# Patient Record
Sex: MALE | Race: WHITE | Employment: FULL TIME | RURAL
[De-identification: names, ages, dates, MRNs, and addresses within clinical notes are randomized per-mention and may not be internally consistent; named-entity substitution may affect disease eponyms.]

---

## 2019-02-11 ENCOUNTER — OFFICE VISIT (OUTPATIENT)
Dept: FAMILY MEDICINE CLINIC | Age: 29
End: 2019-02-11

## 2019-02-11 VITALS
SYSTOLIC BLOOD PRESSURE: 141 MMHG | TEMPERATURE: 98.1 F | HEART RATE: 106 BPM | WEIGHT: 166 LBS | BODY MASS INDEX: 22.48 KG/M2 | HEIGHT: 72 IN | OXYGEN SATURATION: 95 % | RESPIRATION RATE: 16 BRPM | DIASTOLIC BLOOD PRESSURE: 92 MMHG

## 2019-02-11 DIAGNOSIS — R03.0 ELEVATED BLOOD-PRESSURE READING WITHOUT DIAGNOSIS OF HYPERTENSION: ICD-10-CM

## 2019-02-11 DIAGNOSIS — F32.9 MAJOR DEPRESSIVE DISORDER, REMISSION STATUS UNSPECIFIED, UNSPECIFIED WHETHER RECURRENT: Primary | ICD-10-CM

## 2019-02-11 RX ORDER — AMITRIPTYLINE HYDROCHLORIDE 50 MG/1
TABLET, FILM COATED ORAL
Qty: 51 TAB | Refills: 0 | Status: SHIPPED | OUTPATIENT
Start: 2019-02-11 | End: 2019-02-27 | Stop reason: SDUPTHER

## 2019-02-11 RX ORDER — AMITRIPTYLINE HYDROCHLORIDE 75 MG/1
TABLET, FILM COATED ORAL
COMMUNITY
End: 2019-02-11 | Stop reason: SDUPTHER

## 2019-02-11 NOTE — PROGRESS NOTES
Dora Yoon 
34 y.o. male 1990 
[address] [place] [address] 
[id number] [facility] Practice: Progress Note Encounter Date: 2/11/2019 Chief Complaint Patient presents with  Establish Care  
  med refill from previous doctor History provided by patient History of Present Illness Dora Yoon is a 34 y.o. male who presents to clinic today for: NEW PATIENT New patient who presents to establish PCP care. I personally reviewed and updated the patient's medical, surgical, family and social history. PREVIOUS PRIMARY CARE PROVIDER and SPECIALISTS Dr. Ralf Post. Patient decided to come to [facility] due to insurance swithced. RECORDS Provided by patient: none. SPECIALISTS Patient Care Team: 
Mei Jimenez MD as PCP - [facility]) Depression Review: 
Patient is seen for anxiety disorder. Current treatment includes Elavil and no other therapies. Patient reports that elavil work well over the spring anf summer but Ongoig symptoms include: decreased sleep, depressed mood, hopelessness and inappropriate guilt . Patient denies: anhedonia and suicidal ideation homocidal ideation. Reported side effects from the treatment: none Health Maintenance Health Maintenance Due Topic Date Due  
 DTaP/Tdap/Td series (1 - Tdap) 01/15/2011 Review of Systems Review of Systems Constitutional: Negative for chills and fever. Respiratory: Negative for cough, hemoptysis, sputum production and shortness of breath. Cardiovascular: Negative for chest pain and palpitations. Genitourinary: Negative for dysuria and urgency. Skin: Negative for itching and rash. Psychiatric/Behavioral: Positive for depression. Negative for hallucinations, substance abuse and suicidal ideas. The patient is nervous/anxious and has insomnia. Vitals/Objective:  
 
Vitals:  
 02/11/19 1504 BP: (!) 141/92 Pulse: (!) 106 Resp: 16 Temp: 98.1 °F (36.7 °C) TempSrc: Oral  
SpO2: 95% Weight: 166 lb (75.3 kg) Height: 5' 11.75\" (1.822 m) Body mass index is 22.67 kg/m². Wt Readings from Last 3 Encounters:  
02/11/19 166 lb (75.3 kg) 11/30/13 135 lb (61.2 kg) 05/05/13 145 lb (65.8 kg) Physical Exam  
Constitutional: He appears well-developed and well-nourished. HENT:  
Head: Normocephalic and atraumatic. Cardiovascular: Normal rate and regular rhythm. Pulmonary/Chest: Effort normal and breath sounds normal.  
Neurological: He is alert. No results found for this or any previous visit (from the past 24 hour(s)). Assessment and Plan:  
 
Encounter Diagnoses ICD-10-CM ICD-9-CM 1. Major depressive disorder, remission status unspecified, unspecified whether recurrent F32.9 296.20 2. Elevated blood-pressure reading without diagnosis of hypertension R03.0 796.2 1. Major depressive disorder, remission status unspecified, unspecified whether recurrent Resume elavil with ramp-up dosing. Patient to f/u in 2 weeks for mood. - amitriptyline (ELAVIL) 50 mg tablet; Take 1 Tab by mouth nightly for 4 days, THEN 1.5 Tabs nightly for 7 days, THEN 2 Tabs nightly for 18 days. Dispense: 51 Tab; Refill: 0 
- METABOLIC PANEL, BASIC 
- LIPID PANEL I have discussed the diagnosis with the patient and the intended plan as seen in the above orders. he has expressed understanding. The patient has received an after-visit summary and questions were answered concerning future plans. I have discussed medication side effects and warnings with the patient as well. Electronically Signed: Ashvin Mendez MD 
  
History/Allergies Patients past medical, surgical and family histories were reviewed and updated. Past Medical History:  
Diagnosis Date  Depression 10/2018  
 and anxiety  Hypoglycemia 2018 History reviewed. No pertinent surgical history. Family History Problem Relation Age of Onset  Diabetes Mother  Diabetes Father  Colon Cancer Maternal Aunt 48 Colorectal cancer Social History Socioeconomic History  Marital status: SINGLE Spouse name: Not on file  Number of children: Not on file  Years of education: Not on file  Highest education level: Not on file Social Needs  Financial resource strain: Not on file  Food insecurity - worry: Not on file  Food insecurity - inability: Not on file  Transportation needs - medical: Not on file  Transportation needs - non-medical: Not on file Occupational History  Not on file Tobacco Use  Smoking status: Never Smoker  Smokeless tobacco: Never Used Substance and Sexual Activity  Alcohol use: No  
 Drug use: No  
 Sexual activity: Not Currently Other Topics Concern  Not on file Social History Narrative  Not on file No Known Allergies Disposition Follow-up Disposition: 
Return in about 2 weeks (around 2/25/2019) for mood. . 
 
No future appointments. Current Medications after this visit Current Outpatient Medications Medication Sig  
 amitriptyline (ELAVIL) 50 mg tablet Take 1 Tab by mouth nightly for 4 days, THEN 1.5 Tabs nightly for 7 days, THEN 2 Tabs nightly for 18 days. No current facility-administered medications for this visit. Medications Discontinued During This Encounter Medication Reason  azithromycin (ZITHROMAX Z-SEFERINO) 250 mg tablet Not A Current Medication  amitriptyline (ELAVIL) 75 mg tablet Reorder

## 2019-02-11 NOTE — PROGRESS NOTES
1. Have you been to the ER, urgent care clinic since your last visit? Hospitalized since your last visit? No 
 
2. Have you seen or consulted any other health care providers outside of the [facility] since your last visit? Include any pap smears or colon screening. No 
Reviewed record in preparation for visit and have necessary documentation Pt did not bring medication to office visit for review Goals that were addressed and/or need to be completed during or after this appointment include Health Maintenance Due Topic Date Due  
 DTaP/Tdap/Td series (1 - Tdap) 01/15/2011  Influenza Age 5 to Adult  08/01/2018

## 2019-02-11 NOTE — PATIENT INSTRUCTIONS
Depression Treatment: Care Instructions Your Care Instructions Depression is a condition that affects the way you feel, think, and act. It causes symptoms such as low energy, loss of interest in daily activities, and sadness or grouchiness that goes on for a long time. Depression is very common and affects men and women of all ages. Depression is a medical illness caused by changes in the natural chemicals in your brain. It is not a character flaw, and it does not mean that you are a bad or weak person. It does not mean that you are going crazy. It is important to know that depression can be treated. Medicines, counseling, and self-care can all help. Many people do not get help because they are embarrassed or think that they will get over the depression on their own. But some people do not get better without treatment. Follow-up care is a key part of your treatment and safety. Be sure to make and go to all appointments, and call your doctor if you are having problems. It's also a good idea to know your test results and keep a list of the medicines you take. How can you care for yourself at home? Learn about antidepressant medicines Antidepressant medicines can improve or end the symptoms of depression. You may need to take the medicine for at least 6 months, and often longer. Keep taking your medicine even if you feel better. If you stop taking it too soon, your symptoms may come back or get worse. You may start to feel better within 1 to 3 weeks of taking antidepressant medicine. But it can take as many as 6 to 8 weeks to see more improvement. Talk to your doctor if you have problems with your medicine or if you do not notice any improvement after 3 weeks. Antidepressants can make you feel tired, dizzy, or nervous. Some people have dry mouth, constipation, headaches, sexual problems, an upset stomach, or diarrhea.  Many of these side effects are mild and go away on their own after you take the medicine for a few weeks. Some may last longer. Talk to your doctor if side effects bother you too much. You might be able to try a different medicine. If you are pregnant or breastfeeding, talk to your doctor about what medicines you can take. Learn about counseling In many cases, counseling can work as well as medicines to treat mild to moderate depression. Counseling is done by licensed mental health providers, such as psychologists, social workers, and some types of nurses. It can be done in one-on-one sessions or in a group setting. Many people find group sessions helpful. Cognitive-behavioral therapy is a type of counseling. In this treatment therapy, you learn how to see and change unhelpful thinking styles that may be adding to your depression. Counseling and medicines often work well when used together. To manage depression · Be physically active. Getting 30 minutes of exercise each day is good for your body and your mind. Begin slowly if it is hard for you to get started. If you already exercise, keep it up. · Plan something pleasant for yourself every day. Include activities that you have enjoyed in the past. 
· Get enough sleep. Talk to your doctor if you have problems sleeping. · Eat a balanced diet. If you do not feel hungry, eat small snacks rather than large meals. · Do not drink alcohol, use illegal drugs, or take medicines that your doctor has not prescribed for you. They may interfere with your treatment. · Spend time with family and friends. It may help to speak openly about your depression with people you trust. 
· Take your medicines exactly as prescribed. Call your doctor if you think you are having a problem with your medicine. · Do not make major life decisions while you are depressed. Depression may change the way you think. You will be able to make better decisions after you feel better. · Think positively. Challenge negative thoughts with statements such as \"I am hopeful\"; \"Things will get better\"; and \"I can ask for the help I need. \" Write down these statements and read them often, even if you don't believe them yet. · Be patient with yourself. It took time for your depression to develop, and it will take time for your symptoms to improve. Do not take on too much or be too hard on yourself. · Learn all you can about depression from written and online materials. · Check out behavioral health classes to learn more about dealing with depression. · Keep the numbers for these national suicide hotlines: 7-422-587-TALK (2-688.387.9008) and 7-636-CPVHKTI (5-290.461.2022). If you or someone you know talks about suicide or feeling hopeless, get help right away. When should you call for help? Call 911 anytime you think you may need emergency care. For example, call if: 
  · You feel you cannot stop from hurting yourself or someone else.  
[facility] your doctor now or seek immediate medical care if: 
  · You hear voices.  
  · You feel much more depressed.  
 Watch closely for changes in your health, and be sure to contact your doctor if: 
  · You are having problems with your depression medicine.  
  · You are not getting better as expected. Where can you learn more? Go to http://miguel-brian.info/. Enter A282 in the search box to learn more about \"Depression Treatment: Care Instructions. \" Current as of: September 11, 2018 Content Version: 11.9 © 9033-9731 [employer]. Care instructions adapted under license by [employer] (which disclaims liability or warranty for this information). If you have questions about a medical condition or this instruction, always ask your healthcare professional. [address] any warranty or liability for your use of this information.

## 2019-02-12 LAB
BUN SERPL-MCNC: 11 MG/DL (ref 6–20)
BUN/CREAT SERPL: 14 (ref 9–20)
CALCIUM SERPL-MCNC: 10.3 MG/DL (ref 8.7–10.2)
CHLORIDE SERPL-SCNC: 100 MMOL/L (ref 96–106)
CHOLEST SERPL-MCNC: 160 MG/DL (ref 100–199)
CO2 SERPL-SCNC: 26 MMOL/L (ref 20–29)
CREAT SERPL-MCNC: 0.81 MG/DL (ref 0.76–1.27)
GLUCOSE SERPL-MCNC: 85 MG/DL (ref 65–99)
HDLC SERPL-MCNC: 37 MG/DL
LDLC SERPL CALC-MCNC: 81 MG/DL (ref 0–99)
POTASSIUM SERPL-SCNC: 4.7 MMOL/L (ref 3.5–5.2)
SODIUM SERPL-SCNC: 140 MMOL/L (ref 134–144)
TRIGL SERPL-MCNC: 208 MG/DL (ref 0–149)
VLDLC SERPL CALC-MCNC: 42 MG/DL (ref 5–40)

## 2019-02-27 ENCOUNTER — OFFICE VISIT (OUTPATIENT)
Dept: FAMILY MEDICINE CLINIC | Age: 29
End: 2019-02-27

## 2019-02-27 VITALS
WEIGHT: 167 LBS | HEIGHT: 72 IN | OXYGEN SATURATION: 99 % | RESPIRATION RATE: 16 BRPM | SYSTOLIC BLOOD PRESSURE: 128 MMHG | TEMPERATURE: 98.9 F | BODY MASS INDEX: 22.62 KG/M2 | DIASTOLIC BLOOD PRESSURE: 85 MMHG | HEART RATE: 100 BPM

## 2019-02-27 DIAGNOSIS — F32.9 MAJOR DEPRESSIVE DISORDER, REMISSION STATUS UNSPECIFIED, UNSPECIFIED WHETHER RECURRENT: ICD-10-CM

## 2019-02-27 PROBLEM — F33.1 DEPRESSION, MAJOR, RECURRENT, MODERATE (HCC): Status: ACTIVE | Noted: 2019-02-27

## 2019-02-27 RX ORDER — AMITRIPTYLINE HYDROCHLORIDE 75 MG/1
75 TABLET, FILM COATED ORAL
Qty: 30 TAB | Refills: 1 | Status: SHIPPED | OUTPATIENT
Start: 2019-02-27 | End: 2019-03-28 | Stop reason: SDUPTHER

## 2019-02-27 RX ORDER — ESCITALOPRAM OXALATE 10 MG/1
10 TABLET ORAL
Qty: 30 TAB | Refills: 0 | Status: SHIPPED | OUTPATIENT
Start: 2019-02-27 | End: 2019-03-28 | Stop reason: SDUPTHER

## 2019-02-27 NOTE — PROGRESS NOTES
1. Have you been to the ER, urgent care clinic, or been hospitalized since your last visit? No      2. Have you seen or consulted any other health care providers outside of the [address] [place] [address] since your last visit? No    Reviewed record in preparation for visit and have necessary documentation  opportunity was given for questions  Goals that were addressed and/or need to be completed during or after this appointment include     ,There are no preventive care reminders to display for this patient.

## 2019-02-27 NOTE — PROGRESS NOTES
Damian Calvillo  34 y.o. male  1990  [address]  [address] [place] [address] [address] [facility]  [phone]     [facility] Practice: Progress Note       Encounter Date: 2/27/2019    Chief Complaint   Patient presents with    Depression     seems to help with depression, still feels anxious, experiencing dry mouth       History provided by patient  History of Present Illness   Damian Calvillo is a 34 y.o. male who presents to clinic today for:    Depression and Anxiety Review:  Patient is seen for depression and anxiety. Current treatment includes Elavil and no other therapies. Has been feeling less depressed but more anxious all the time. Prior treatments: Lexapro (helped but drowsiness)  Ongoig symptoms include:  racing thoughts, psychomotor agitation, difficulty concentrating. Patient denies: anhedonia, depressed mood and inappropriate guilt suicidal ideation, homocidal ideation. Reported side effects from the treatment: dry mouth (worse with the 100  Mg dose but present with all)     Health Maintenance  There are no preventive care reminders to display for this patient. Review of Systems   Review of Systems   Constitutional: Negative for chills and fever. Cardiovascular: Negative for chest pain and palpitations. Gastrointestinal: Negative for abdominal pain, heartburn, nausea and vomiting. Genitourinary: Negative for dysuria, frequency, hematuria and urgency. Skin: Negative for itching and rash. Neurological: Negative for dizziness and headaches. Psychiatric/Behavioral: Positive for depression. Negative for hallucinations, memory loss and suicidal ideas. The patient is nervous/anxious. The patient does not have insomnia. Vitals/Objective:     Vitals:    02/27/19 1417   BP: 128/85   Pulse: 100   Resp: 16   Temp: 98.9 °F (37.2 °C)   TempSrc: Oral   SpO2: 99%   Weight: 167 lb (75.8 kg)   Height: 5' 11.75\" (1.822 m)     Body mass index is 22.81 kg/m².     Wt Readings from Last 3 Encounters:   02/27/19 167 lb (75.8 kg)   02/11/19 166 lb (75.3 kg)   11/30/13 135 lb (61.2 kg)       Physical Exam   Constitutional: He is oriented to person, place, and time. He appears well-developed and well-nourished. Cardiovascular: Normal rate and regular rhythm. No murmur heard. Pulmonary/Chest: Effort normal and breath sounds normal.   Musculoskeletal: Normal range of motion. He exhibits no edema or tenderness. Neurological: He is alert and oriented to person, place, and time. No results found for this or any previous visit (from the past 24 hour(s)). Assessment and Plan:     Encounter Diagnoses     ICD-10-CM ICD-9-CM   1. Major depressive disorder, remission status unspecified, unspecified whether recurrent F32.9 296.20       1. Major depressive disorder, remission status unspecified, unspecified whether recurrent  Reduce dose of elavil due to side effects and add lexapro for anxiety. Patient given number for counselor but reports that his insurance with his job will not start for another month or too and he may not be able to afford medication. - amitriptyline (ELAVIL) 75 mg tablet; Take 1 Tab by mouth nightly. Dispense: 30 Tab; Refill: 1  - escitalopram oxalate (LEXAPRO) 10 mg tablet; Take 1 Tab by mouth nightly. Dispense: 30 Tab; Refill: 0      I have discussed the diagnosis with the patient and the intended plan as seen in the above orders. he has expressed understanding. The patient has received an after-visit summary and questions were answered concerning future plans. I have discussed medication side effects and warnings with the patient as well. Electronically Signed: Fracnes Tobias MD     History/Allergies   Patients past medical, surgical and family histories were reviewed and updated. Past Medical History:   Diagnosis Date    Depression 10/2018    and anxiety    Ganglion cyst of dorsum of left wrist     Hypoglycemia 2018    History reviewed. No pertinent surgical history.   Family History Problem Relation Age of Onset    Diabetes Mother     Diabetes Father     Colon Cancer Maternal Aunt 48        Colorectal cancer     Social History     Socioeconomic History    Marital status: SINGLE     Spouse name: Not on file    Number of children: Not on file    Years of education: Not on file    Highest education level: Not on file   Social Needs    Financial resource strain: Not on file    Food insecurity - worry: Not on file    Food insecurity - inability: Not on file   [employer] needs - medical: Not on file   [employer] needs - non-medical: Not on file   Occupational History    Not on file   Tobacco Use    Smoking status: Never Smoker    Smokeless tobacco: Never Used   Substance and Sexual Activity    Alcohol use: No    Drug use: No    Sexual activity: Not Currently   Other Topics Concern    Not on file   Social History Narrative    Not on file         No Known Allergies    Disposition     Follow-up Disposition:  Return in about 2 weeks (around 3/13/2019) for mood. .    Future Appointments   Date Time Provider Rachel Harrell   3/13/2019  2:10 PM Yadiel Cullen MD [facility] SCHED            Current Medications after this visit     Current Outpatient Medications   Medication Sig    amitriptyline (ELAVIL) 75 mg tablet Take 1 Tab by mouth nightly.  escitalopram oxalate (LEXAPRO) 10 mg tablet Take 1 Tab by mouth nightly. No current facility-administered medications for this visit.       Medications Discontinued During This Encounter   Medication Reason    amitriptyline (ELAVIL) 50 mg tablet Reorder

## 2019-03-28 DIAGNOSIS — F32.9 MAJOR DEPRESSIVE DISORDER, REMISSION STATUS UNSPECIFIED, UNSPECIFIED WHETHER RECURRENT: ICD-10-CM

## 2019-03-28 RX ORDER — AMITRIPTYLINE HYDROCHLORIDE 75 MG/1
75 TABLET, FILM COATED ORAL
Qty: 30 TAB | Refills: 0 | Status: SHIPPED | OUTPATIENT
Start: 2019-03-28 | End: 2019-04-26 | Stop reason: SDUPTHER

## 2019-03-28 RX ORDER — ESCITALOPRAM OXALATE 10 MG/1
10 TABLET ORAL
Qty: 30 TAB | Refills: 0 | Status: SHIPPED | OUTPATIENT
Start: 2019-03-28 | End: 2019-04-26

## 2019-04-26 ENCOUNTER — OFFICE VISIT (OUTPATIENT)
Dept: FAMILY MEDICINE CLINIC | Age: 29
End: 2019-04-26

## 2019-04-26 VITALS
RESPIRATION RATE: 16 BRPM | DIASTOLIC BLOOD PRESSURE: 76 MMHG | TEMPERATURE: 98.6 F | OXYGEN SATURATION: 97 % | WEIGHT: 174 LBS | HEIGHT: 71 IN | HEART RATE: 100 BPM | BODY MASS INDEX: 24.36 KG/M2 | SYSTOLIC BLOOD PRESSURE: 123 MMHG

## 2019-04-26 DIAGNOSIS — F41.1 GAD (GENERALIZED ANXIETY DISORDER): ICD-10-CM

## 2019-04-26 DIAGNOSIS — Z82.0 FAMILY HISTORY OF NARCOLEPSY: ICD-10-CM

## 2019-04-26 DIAGNOSIS — G47.19 EXCESSIVE DAYTIME SLEEPINESS: ICD-10-CM

## 2019-04-26 DIAGNOSIS — F32.9 MAJOR DEPRESSIVE DISORDER, REMISSION STATUS UNSPECIFIED, UNSPECIFIED WHETHER RECURRENT: Primary | ICD-10-CM

## 2019-04-26 RX ORDER — AMITRIPTYLINE HYDROCHLORIDE 75 MG/1
75 TABLET, FILM COATED ORAL
Qty: 30 TAB | Refills: 5 | Status: SHIPPED | OUTPATIENT
Start: 2019-04-26 | End: 2019-11-07 | Stop reason: SDUPTHER

## 2019-04-26 RX ORDER — ESCITALOPRAM OXALATE 10 MG/1
20 TABLET ORAL
Qty: 60 TAB | Refills: 0 | Status: SHIPPED | OUTPATIENT
Start: 2019-04-26 | End: 2019-06-04

## 2019-04-26 NOTE — PROGRESS NOTES
1. Have you been to the ER, urgent care clinic since your last visit? Hospitalized since your last visit? No    2. Have you seen or consulted any other health care providers outside of the [address] [place] [address] since your last visit? Include any pap smears or colon screening. No  Reviewed record in preparation for visit and have necessary documentation  Pt did not bring medication to office visit for review    Goals that were addressed and/or need to be completed during or after this appointment include     There are no preventive care reminders to display for this patient.

## 2019-04-26 NOTE — PROGRESS NOTES
Naa Davis  34 y.o. male  1990  [address] [address]  [id number]     [address]: Progress Note       Encounter Date: 4/26/2019    Chief Complaint   Patient presents with    Follow-up       History provided by patient  History of Present Illness   Naa Davis is a 34 y.o. male who presents to clinic today for:    Depression and Anxiety Review:  Patient is seen for depression and anxiety. Current treatment includes Elavil and lexapro and no other therapies. Patient endorses that his insurance will not start until May. Prior treatments: Lexapro (helped but drowsiness)  Ongoig symptoms include:  racing thoughts, psychomotor agitation, difficulty concentrating. +anger flares. No inciting cause has been happening intermittently for >6 months. Patient denies: anhedonia, depressed mood and inappropriate guilt suicidal ideation, homocidal ideation. Reported side effects from the treatment: n/a     Fatigue  Patient reports that he never feels rested despite sleeping. He now works on the nightshift. Patient is unaware of snoring but endorses that if he sits more then a few minutes, he will fall asleep. Health Maintenance  There are no preventive care reminders to display for this patient. Review of Systems   Review of Systems   Constitutional: Negative for chills and fever. Cardiovascular: Negative for chest pain and palpitations. Skin: Negative for itching and rash. Psychiatric/Behavioral: The patient is nervous/anxious and has insomnia. Vitals/Objective:     Vitals:    04/26/19 0948   BP: 123/76   Pulse: 100   Resp: 16   Temp: 98.6 °F (37 °C)   TempSrc: Oral   SpO2: 97%   Weight: 174 lb (78.9 kg)   Height: 5' 11\" (1.803 m)     Body mass index is 24.27 kg/m².     Wt Readings from Last 3 Encounters:   04/26/19 174 lb (78.9 kg)   02/27/19 167 lb (75.8 kg)   02/11/19 166 lb (75.3 kg)       Physical Exam   Constitutional: He is oriented to person, place, and time. He appears well-developed and well-nourished. No distress. HENT:   Head: Normocephalic. Right Ear: External ear normal.   Left Ear: External ear normal.   Mouth/Throat: Oropharynx is clear and moist.   Eyes: Right eye exhibits no discharge. Left eye exhibits no discharge. Cardiovascular: Normal rate and regular rhythm. Pulmonary/Chest: Effort normal and breath sounds normal.   Musculoskeletal: Normal range of motion. He exhibits no edema. Neurological: He is alert and oriented to person, place, and time. No results found for this or any previous visit (from the past 24 hour(s)). Assessment and Plan:     Encounter Diagnoses     ICD-10-CM ICD-9-CM   1. Major depressive disorder, remission status unspecified, unspecified whether recurrent F32.9 296.20   2. DAVID (generalized anxiety disorder) F41.1 300.02   3. Excessive daytime sleepiness G47.19 780.54   4. Family history of narcolepsy Z82.0 V19.8       1. Major depressive disorder, remission status unspecified, unspecified whether recurrent  2. DAVID (generalized anxiety disorder)  Patient agreed to increase dose of lexapro at this time. He agreed to f/u with psychiatry once his insurance starts. - escitalopram oxalate (LEXAPRO) 10 mg tablet; Take 2 Tabs by mouth nightly. Indications: Anxiousness associated with Depression  Dispense: 60 Tab; Refill: 0  - amitriptyline (ELAVIL) 75 mg tablet; Take 1 Tab by mouth nightly. Indications: depression  Dispense: 30 Tab; Refill: 5  - REFERRAL TO PSYCHIATRY    3. Excessive daytime sleepiness  4. Family history of narcolepsy  Patient reports prior sleep study that was negative for sleep apnea but is otherwise not clear if any other findings. Will refer to sleep medicine in [place] per patient preference. - REFERRAL TO SLEEP STUDIES      I have discussed the diagnosis with the patient and the intended plan as seen in the above orders. he has expressed understanding.   The patient has received an after-visit summary and questions were answered concerning future plans. I have discussed medication side effects and warnings with the patient as well. Electronically Signed: Jeffrey Barker MD     History/Allergies   Patients past medical, surgical and family histories were reviewed and updated. Past Medical History:   Diagnosis Date    Depression 10/2018    and anxiety    Ganglion cyst of dorsum of left wrist     Hypoglycemia 2018      History reviewed. No pertinent surgical history. Family History   Problem Relation Age of Onset    Diabetes Mother     Diabetes Father     Other Father         Narcolepsy    Colon Cancer Maternal Aunt 48        Colorectal cancer     Social History     Tobacco Use    Smoking status: Never Smoker    Smokeless tobacco: Never Used   Substance Use Topics    Alcohol use: No    Drug use: No          No Known Allergies    Disposition     Follow-up and Dispositions  ·   Return in about 3 months (around 7/26/2019) for mood. .         No future appointments. Current Medications after this visit     Current Outpatient Medications   Medication Sig    escitalopram oxalate (LEXAPRO) 10 mg tablet Take 2 Tabs by mouth nightly. Indications: Anxiousness associated with Depression    amitriptyline (ELAVIL) 75 mg tablet Take 1 Tab by mouth nightly. Indications: depression     No current facility-administered medications for this visit.       Medications Discontinued During This Encounter   Medication Reason    escitalopram oxalate (LEXAPRO) 10 mg tablet     amitriptyline (ELAVIL) 75 mg tablet Reorder

## 2019-06-04 DIAGNOSIS — F32.9 MAJOR DEPRESSIVE DISORDER, REMISSION STATUS UNSPECIFIED, UNSPECIFIED WHETHER RECURRENT: ICD-10-CM

## 2019-06-04 DIAGNOSIS — F41.1 GAD (GENERALIZED ANXIETY DISORDER): ICD-10-CM

## 2019-06-04 RX ORDER — ESCITALOPRAM OXALATE 10 MG/1
20 TABLET ORAL
Qty: 60 TAB | Refills: 0 | Status: CANCELLED | OUTPATIENT
Start: 2019-06-04

## 2019-06-04 RX ORDER — AMITRIPTYLINE HYDROCHLORIDE 75 MG/1
75 TABLET, FILM COATED ORAL
Qty: 30 TAB | Refills: 5 | OUTPATIENT
Start: 2019-06-04

## 2019-06-04 RX ORDER — ESCITALOPRAM OXALATE 20 MG/1
20 TABLET ORAL
Qty: 90 TAB | Refills: 0 | Status: SHIPPED | OUTPATIENT
Start: 2019-06-04 | End: 2019-06-20

## 2019-11-07 ENCOUNTER — OFFICE VISIT (OUTPATIENT)
Dept: FAMILY MEDICINE CLINIC | Age: 29
End: 2019-11-07

## 2019-11-07 VITALS
HEIGHT: 71 IN | RESPIRATION RATE: 16 BRPM | BODY MASS INDEX: 24.08 KG/M2 | DIASTOLIC BLOOD PRESSURE: 85 MMHG | SYSTOLIC BLOOD PRESSURE: 131 MMHG | HEART RATE: 92 BPM | OXYGEN SATURATION: 100 % | WEIGHT: 172 LBS | TEMPERATURE: 98.2 F

## 2019-11-07 DIAGNOSIS — F41.1 GAD (GENERALIZED ANXIETY DISORDER): ICD-10-CM

## 2019-11-07 DIAGNOSIS — R53.83 LOW ENERGY: ICD-10-CM

## 2019-11-07 DIAGNOSIS — F33.1 DEPRESSION, MAJOR, RECURRENT, MODERATE (HCC): Primary | ICD-10-CM

## 2019-11-07 RX ORDER — AMITRIPTYLINE HYDROCHLORIDE 75 MG/1
75 TABLET, FILM COATED ORAL
Qty: 30 TAB | Refills: 5 | Status: SHIPPED | OUTPATIENT
Start: 2019-11-07 | End: 2020-03-02 | Stop reason: ALTCHOICE

## 2019-11-07 RX ORDER — CITALOPRAM 20 MG/1
TABLET, FILM COATED ORAL
Qty: 30 TAB | Refills: 1 | Status: SHIPPED | OUTPATIENT
Start: 2019-11-07 | End: 2020-01-25

## 2019-11-07 NOTE — PROGRESS NOTES
CC: f/u depression and anxiety    HPI: Pt is a 34 y.o. male who presents for f/u depression and anxiety. He has been having low energy for the past few months. His mood has been generally mad or sad. He has spent some days just crying in bed and states that he cannot remember when he last enjoyed an activity. He works night shift and tries to stay on his schedule on days off. He does think he gets at least 8 hours of sleep every day without awakening, but never feels rested. He is currently taking Elavil 75mg before he goes to bed (around 12PM). He had been also taking Lexapro 20mg daily but ran out a week ago. When he first started taking these two medications a few years ago they were working well. He has never tried any other psychiatric medications but has been on 100mg of Elavil but that made his mouth very dry. Denies SI/HI, AH/VH, and periods of increased productivity or impulsivity and decreased need for sleep.        Past Medical History:   Diagnosis Date    Depression 10/2018    and anxiety    Ganglion cyst of dorsum of left wrist     Hypoglycemia 2018       Family History   Problem Relation Age of Onset    Diabetes Mother     Diabetes Father     Other Father         Narcolepsy    Colon Cancer Maternal Aunt 48        Colorectal cancer       Social History     Tobacco Use    Smoking status: Never Smoker    Smokeless tobacco: Never Used   Substance Use Topics    Alcohol use: No    Drug use: No       ROS:  Per HPI    PE:  Visit Vitals  /85 (BP 1 Location: Right arm, BP Patient Position: Sitting)   Pulse 92   Temp 98.2 °F (36.8 °C) (Oral)   Resp 16   Ht 5' 11\" (1.803 m)   Wt 172 lb (78 kg)   SpO2 100%   BMI 23.99 kg/m²     Gen: Pt sitting in chair, in NAD  Head: Normocephalic, atraumatic  Eyes: Sclera anicteric, EOM grossly intact, PERRL  Throat: MMM, normal lips, tongue and gums  Neck: Supple  CVS: Normal S1, S2, no m/r/g  Resp: CTAB, no wheezes or rales  Extrem: Atraumatic, no cyanosis or edema  Pulses: 2+   Skin: Warm, dry  Neuro: Alert, oriented, appropriate  Psych: Denies SI/HI      A/P:   Encounter Diagnoses     ICD-10-CM ICD-9-CM   1. Depression, major, recurrent, moderate (HCC) F33.1 296.32   2. DAVID (generalized anxiety disorder) F41.1 300.02   3. Major depressive disorder, remission status unspecified, unspecified whether recurrent F32.9 296.20     1. Depression, major, recurrent, moderate ([facility] [address]): Worsening. Discussed options with pt, will do trial of Celexa instead. - START citalopram (CELEXA) 20 mg tablet; Take one half tab daily for the first week. Then you can increase to one full tab daily. Dispense: 30 Tab; Refill: 1  - Continue Elavil    2. DAVID (generalized anxiety disorder): This seems better under control at the moment. 3. Decreased energy: Likely 2/2 depression but will do more work-up if not improved with improving mood, as pt reports both parents have narcolepsy. RTC in 4 weeks for f/u mood, or sooner prn    Discussed diagnoses in detail with patient. Medication risks/benefits/side effects discussed with patient. All of the patient's questions were addressed. The patient understands and agrees with our plan of care. The patient knows to call back if they are unsure of or forget any changes we discussed today or if the symptoms change. The patient received an After-Visit Summary which contains VS, orders, medication list and allergy list. This can be used as a \"mini-medical record\" should they have to seek medical care while out of town. No current outpatient medications on file prior to visit. No current facility-administered medications on file prior to visit.

## 2019-11-07 NOTE — PROGRESS NOTES
1. Have you been to the ER, urgent care clinic since your last visit? Hospitalized since your last visit? No    2. Have you seen or consulted any other health care providers outside of the [address] [place] [address] since your last visit? Include any pap smears or colon screening.  No  Reviewed record in preparation for visit and have necessary documentation  Pt did not bring medication to office visit for review    Goals that were addressed and/or need to be completed during or after this appointment include     Health Maintenance Due   Topic Date Due    Influenza Age 5 to Adult  08/01/2019

## 2019-11-07 NOTE — PATIENT INSTRUCTIONS

## 2020-01-24 DIAGNOSIS — F33.1 DEPRESSION, MAJOR, RECURRENT, MODERATE (HCC): ICD-10-CM

## 2020-01-24 NOTE — LETTER
1/27/2020 4:18 PM 
 
Mr. Vilma Lamas [phone] [facility] [address] Dear Mr. Alayna North: 
 
Kristian Phelan missed you! Please call our office at [phone] and schedule a follow up appointment for your continued care. Sincerely, FISH SANCHEZ & SADAF NICOLE [facility] & [facility] [place]

## 2020-01-25 RX ORDER — CITALOPRAM 20 MG/1
TABLET, FILM COATED ORAL
Qty: 30 TAB | Refills: 0 | Status: SHIPPED | OUTPATIENT
Start: 2020-01-25 | End: 2020-03-02 | Stop reason: ALTCHOICE

## 2020-01-27 NOTE — TELEPHONE ENCOUNTER
Attempted to call. Unsuccessful.    Need to inform pt   Needs to schedule a follow up appointment  Letter sent  [facility] message sent

## 2020-03-02 ENCOUNTER — HOSPITAL ENCOUNTER (OUTPATIENT)
Dept: LAB | Age: 30
Discharge: HOME | End: 2020-03-02

## 2020-03-02 ENCOUNTER — OFFICE VISIT (OUTPATIENT)
Dept: FAMILY MEDICINE CLINIC | Age: 30
End: 2020-03-02

## 2020-03-02 VITALS
HEIGHT: 71 IN | BODY MASS INDEX: 24.92 KG/M2 | OXYGEN SATURATION: 100 % | DIASTOLIC BLOOD PRESSURE: 83 MMHG | TEMPERATURE: 97.8 F | HEART RATE: 110 BPM | SYSTOLIC BLOOD PRESSURE: 143 MMHG | WEIGHT: 178 LBS | RESPIRATION RATE: 18 BRPM

## 2020-03-02 DIAGNOSIS — F33.1 DEPRESSION, MAJOR, RECURRENT, MODERATE (HCC): ICD-10-CM

## 2020-03-02 DIAGNOSIS — R03.0 ELEVATED BP WITHOUT DIAGNOSIS OF HYPERTENSION: ICD-10-CM

## 2020-03-02 DIAGNOSIS — R00.0 TACHYCARDIA: ICD-10-CM

## 2020-03-02 DIAGNOSIS — F41.1 GAD (GENERALIZED ANXIETY DISORDER): ICD-10-CM

## 2020-03-02 DIAGNOSIS — F41.1 GAD (GENERALIZED ANXIETY DISORDER): Primary | ICD-10-CM

## 2020-03-02 LAB — TSH SERPL DL<=0.05 MIU/L-ACNC: 1.18 UIU/ML (ref 0.36–3.74)

## 2020-03-02 RX ORDER — QUETIAPINE FUMARATE 25 MG/1
TABLET, FILM COATED ORAL
Qty: 60 TAB | Refills: 1 | Status: SHIPPED | OUTPATIENT
Start: 2020-03-02 | End: 2020-05-15

## 2020-03-02 NOTE — PROGRESS NOTES
1. Have you been to the ER, urgent care clinic since your last visit? Hospitalized since your last visit? no    2. Have you seen or consulted any other health care providers outside of the [address] [place] [address] since your last visit? Include any pap smears or colon screening. No  Reviewed record in preparation for visit and have obtained necessary documentation. Patient did not bring medications to visit for review. Information provided on Advanced Directive, Living Will. Body mass index is 24.83 kg/m². There are no preventive care reminders to display for this patient.

## 2020-03-02 NOTE — PROGRESS NOTES
CC: f/u depression    HPI: Pt is a 27 y.o. male who presents for f/u depression. He was started on Celexa 4 months ago and does not feel like it is helping, in fact his depression may be worsening. Today he endorses that he will have periods of 2-3 days where he does not need to sleep at all, and then will have days when he sleeps all day, describes is as going form \"one extreme to the other. \" He also reports impulsivity with spending money. He denies AH/VH and SI/HI. He does report that several of his family members have thyroid problems and he has not had a TSH checked that he is aware of. Today he is tachycardic with mildly elevated BP. He denies substance abuse. Past Medical History:   Diagnosis Date    Depression 10/2018    and anxiety    Ganglion cyst of dorsum of left wrist     Hypoglycemia 2018       Family History   Problem Relation Age of Onset    Diabetes Mother     Diabetes Father     Other Father         Narcolepsy    Colon Cancer Maternal Aunt 48        Colorectal cancer       Social History     Tobacco Use    Smoking status: Never Smoker    Smokeless tobacco: Never Used   Substance Use Topics    Alcohol use: No    Drug use: No       ROS:  Per HPI    PE:  Visit Vitals  /83 (BP 1 Location: Left arm, BP Patient Position: Sitting)   Pulse (!) 110   Temp 97.8 °F (36.6 °C) (Oral)   Resp 18   Ht 5' 11\" (1.803 m)   Wt 178 lb (80.7 kg)   SpO2 100%   BMI 24.83 kg/m²     Gen: Pt sitting in chair, in NAD  Head: Normocephalic, atraumatic  Eyes: Sclera anicteric, EOM grossly intact, PERRL  Throat: MMM, normal lips, tongue and gums  Neck: Supple  CVS: Normal S1, S2, no m/r/g. +tachycardia  Resp: CTAB, no wheezes or rales  Extrem: Atraumatic, no cyanosis or edema  Skin: Warm, dry  Neuro: Alert, oriented, appropriate  Psych: Affect full and appropriate. Speech non-pressured, clear and coherent. Denies AH/VH and SI/HI      A/P:   Encounter Diagnoses     ICD-10-CM ICD-9-CM   1.  DAVID (generalized anxiety disorder) F41.1 300.02   2. Depression, major, recurrent, moderate (HCC) F33.1 296.32     1. DAVID (generalized anxiety disorder)/Depression: Possibly a component of luc? Discussed with pt, given no response to Celexa, and prior to that Lexapro and Elavil, would like him to see Psychiatry for further evaluation. In the meantime discussed options of switching to another SSRI or SNRI vs switching to Seroquel. Decision made to try Seroquel. - Instructions given for pt to taper off Celexa and Elavil and add Seroquel  - TSH 3RD GENERATION; Future  - REFERRAL TO PSYCHIATRY  - QUEtiapine (SEROQUEL) 25 mg tablet; Take one tab daily for the first week. Then you can take two tabs daily. Take before you go to sleep. Dispense: 60 Tab; Refill: 1    2. Tachycardia: Mild, sounds regular on exam. Possibly related to anxiety vs thyroid condition? 3. Elevated BP without diagnosis of HTN: Could be related to anxiety vs potential thyroid condition. Will recheck at next visit in 4 weeks. RTC in 4 weeks for f/u mood, HR and BP    Discussed diagnoses in detail with patient. Medication risks/benefits/side effects discussed with patient. All of the patient's questions were addressed. The patient understands and agrees with our plan of care. The patient knows to call back if they are unsure of or forget any changes we discussed today or if the symptoms change. The patient received an After-Visit Summary which contains VS, orders, medication list and allergy list. This can be used as a \"mini-medical record\" should they have to seek medical care while out of town. Current Outpatient Medications on File Prior to Visit   Medication Sig Dispense Refill    citalopram (CELEXA) 20 mg tablet TAKE 1/2 TABLET BY MOUTH DAILY FOR THE FIRST WEEK, THEN YOU INCREASE TO 1 TABLET DAILY 30 Tab 0    amitriptyline (ELAVIL) 75 mg tablet Take 1 Tab by mouth nightly.  Indications: depression 30 Tab 5     No current facility-administered medications on file prior to visit.

## 2020-03-02 NOTE — PATIENT INSTRUCTIONS
Week One: Take one-half tab of Celexa. Take one-half tab of Elavil. Take one tab of Seroquel. Week Two: Stop taking Celexa and Elavil. You can take 2 tabs of Seroquel. Recovering From Depression: Care Instructions  Your Care Instructions    Taking good care of yourself is important as you recover from depression. In time, your symptoms will fade as your treatment takes hold. Do not give up. Instead, focus your energy on getting better. Your mood will improve. It just takes some time. Focus on things that can help you feel better, such as being with friends and family, eating well, and getting enough rest. But take things slowly. Do not do too much too soon. You will begin to feel better gradually. Follow-up care is a key part of your treatment and safety. Be sure to make and go to all appointments, and call your doctor if you are having problems. It's also a good idea to know your test results and keep a list of the medicines you take. How can you care for yourself at home? Be realistic  · If you have a large task to do, break it up into smaller steps you can handle, and just do what you can. · You may want to put off important decisions until your depression has lifted. If you have plans that will have a major impact on your life, such as marriage, divorce, or a job change, try to wait a bit. Talk it over with friends and loved ones who can help you look at the overall picture first.  · Reaching out to people for help is important. Do not isolate yourself. Let your family and friends help you. Find someone you can trust and confide in, and talk to that person. · Be patient, and be kind to yourself. Remember that depression is not your fault and is not something you can overcome with willpower alone. Treatment is necessary for depression, just like for any other illness. Feeling better takes time, and your mood will improve little by little. Stay active  · Stay busy and get outside.  Take a walk, or try some other light exercise. · Talk with your doctor about an exercise program. Exercise can help with mild depression. · Go to a movie or concert. Take part in a [employer] activity or other social gathering. Go to a ball game. · Ask a friend to have dinner with you. Take care of yourself  · Eat a balanced diet with plenty of fresh fruits and vegetables, whole grains, and lean protein. If you have lost your appetite, eat small snacks rather than large meals. · Avoid drinking alcohol or using illegal drugs. Do not take medicines that have not been prescribed for you. They may interfere with medicines you may be taking for depression, or they may make your depression worse. · Take your medicines exactly as they are prescribed. You may start to feel better within 1 to 3 weeks of taking antidepressant medicine. But it can take as many as 6 to 8 weeks to see more improvement. If you have questions or concerns about your medicines, or if you do not notice any improvement by 3 weeks, talk to your doctor. · If you have any side effects from your medicine, tell your doctor. Antidepressants can make you feel tired, dizzy, or nervous. Some people have dry mouth, constipation, headaches, sexual problems, or diarrhea. Many of these side effects are mild and will go away on their own after you have been taking the medicine for a few weeks. Some may last longer. Talk to your doctor if side effects are bothering you too much. You might be able to try a different medicine. · Get enough sleep. If you have problems sleeping:  ? Go to bed at the same time every night, and get up at the same time every morning. ? Keep your bedroom dark and quiet. ? Do not exercise after 5:00 p.m.  ? Avoid drinks with caffeine after 5:00 p.m. · Avoid sleeping pills unless they are prescribed by the doctor treating your depression.  Sleeping pills may make you groggy during the day, and they may interact with other medicine you are taking. · If you have any other illnesses, such as diabetes, heart disease, or high blood pressure, make sure to continue with your treatment. Tell your doctor about all of the medicines you take, including those with or without a prescription. · Keep the numbers for these national suicide hotlines: 2-717-823-TALK (6-485.755.6476) and 9-794-VZESOZR (5-193.614.7941). If you or someone you know talks about suicide or feeling hopeless, get help right away. When should you call for help? Call 911 anytime you think you may need emergency care. For example, call if:    · You feel like hurting yourself or someone else.     · Someone you know has depression and is about to attempt or is attempting suicide.   [facility] your doctor now or seek immediate medical care if:    · You hear voices.     · Someone you know has depression and:  ? Starts to give away his or her possessions. ? Uses illegal drugs or drinks alcohol heavily. ? Talks or writes about death, including writing suicide notes or talking about guns, knives, or pills. ? Starts to spend a lot of time alone. ? Acts very aggressively or suddenly appears calm.    Watch closely for changes in your health, and be sure to contact your doctor if:    · You do not get better as expected. Where can you learn more? Go to http://miguel-brian.info/. Enter V999 in the search box to learn more about \"Recovering From Depression: Care Instructions. \"  Current as of: May 28, 2019  Content Version: 12.2  © 8411-9945 [employer]. Care instructions adapted under license by [employer] (which disclaims liability or warranty for this information). If you have questions about a medical condition or this instruction, always ask your healthcare professional. [address] any warranty or liability for your use of this information.

## 2020-03-31 ENCOUNTER — TELEPHONE (OUTPATIENT)
Dept: FAMILY MEDICINE CLINIC | Age: 30
End: 2020-03-31

## 2020-03-31 NOTE — TELEPHONE ENCOUNTER
Left message for patient to call. He needs to reschedule his 4/2/20 appointment to a virtual appointment if possible.

## 2022-03-19 PROBLEM — F41.1 GAD (GENERALIZED ANXIETY DISORDER): Status: ACTIVE | Noted: 2019-04-26

## 2022-03-19 PROBLEM — F33.1 DEPRESSION, MAJOR, RECURRENT, MODERATE (HCC): Status: ACTIVE | Noted: 2019-02-27

## 2025-04-10 ENCOUNTER — TRANSCRIBE ORDERS (OUTPATIENT)
Facility: HOSPITAL | Age: 35
End: 2025-04-10

## 2025-04-10 DIAGNOSIS — M51.34 DEGENERATION, INTERVERTEBRAL DISC, THORACIC: Primary | ICD-10-CM

## 2025-04-10 DIAGNOSIS — M51.370 DEGENERATION OF INTERVERTEBRAL DISC OF LUMBOSACRAL REGION WITH DISCOGENIC BACK PAIN: ICD-10-CM
